# Patient Record
Sex: MALE | Race: WHITE
[De-identification: names, ages, dates, MRNs, and addresses within clinical notes are randomized per-mention and may not be internally consistent; named-entity substitution may affect disease eponyms.]

---

## 2018-03-18 ENCOUNTER — HOSPITAL ENCOUNTER (EMERGENCY)
Dept: HOSPITAL 62 - ER | Age: 38
Discharge: HOME | End: 2018-03-18
Payer: SELF-PAY

## 2018-03-18 VITALS — SYSTOLIC BLOOD PRESSURE: 117 MMHG | DIASTOLIC BLOOD PRESSURE: 65 MMHG

## 2018-03-18 DIAGNOSIS — S01.01XA: Primary | ICD-10-CM
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
## 2018-03-18 DIAGNOSIS — F17.200: ICD-10-CM

## 2018-03-18 PROCEDURE — 72125 CT NECK SPINE W/O DYE: CPT

## 2018-03-18 PROCEDURE — 90471 IMMUNIZATION ADMIN: CPT

## 2018-03-18 PROCEDURE — 12004 RPR S/N/AX/GEN/TRK7.6-12.5CM: CPT

## 2018-03-18 PROCEDURE — 70450 CT HEAD/BRAIN W/O DYE: CPT

## 2018-03-18 PROCEDURE — 90715 TDAP VACCINE 7 YRS/> IM: CPT

## 2018-03-18 PROCEDURE — 99283 EMERGENCY DEPT VISIT LOW MDM: CPT
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
## 2018-03-18 NOTE — RADIOLOGY REPORT (SQ)
EXAM DESCRIPTION:

CT CERVICAL SPINE WITHOUT



CLINICAL HISTORY:

37 years Male, head injury



COMPARISON:

None.



TECHNIQUE:

No contrast.  Coronal and sagittal reformat.  This exam was

performed according to our departmental dose-optimization

program, which includes automated exposure control, adjustment of

the mA and/or kV according to patient size and/or use of

iterative reconstruction technique.



FINDINGS:



No fracture or subluxation. Normal alignment and curvature.

Normal vertebral and intervertebral heights.



Mild bilateral cervical lymphadenopathy includes a left level IIa

cervical lymph node measuring 1.2 x 0.8 cm, image 51 of series 3.



Unenhanced nuchal soft tissues, inferior cranium, and upper

thorax appear otherwise grossly intact.



Impression:



Mild cervical lymphadenopathy. No acute traumatic findings of the

cervical spine.

## 2018-03-18 NOTE — RADIOLOGY REPORT (SQ)
EXAM DESCRIPTION:

CT HEAD WITHOUT



CLINICAL HISTORY:

37 years Male, head injury



COMPARISON:

None.



TECHNIQUE:  No contrast.  This exam was performed according to

our departmental dose-optimization program, which includes

automated exposure control, adjustment of the mA and/or kV

according to patient size and/or use of iterative reconstruction

technique.



FINDINGS:  No hemorrhage or infarct. No mass, mass effect, or

midline shift. Caval septum pellucidum. Mild megacisterna magna.

Mild left maxillary mucosal thickening.

Minimal ethmoid mucus. Soft tissue laceration of the right

parietal scalp.

Brain and extra-axial structures appear otherwise intact.



IMPRESSION: Scalp injury. Else, no acute intracranial findings.